# Patient Record
Sex: FEMALE | Race: BLACK OR AFRICAN AMERICAN | NOT HISPANIC OR LATINO | Employment: PART TIME | URBAN - METROPOLITAN AREA
[De-identification: names, ages, dates, MRNs, and addresses within clinical notes are randomized per-mention and may not be internally consistent; named-entity substitution may affect disease eponyms.]

---
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
## 2024-01-03 PROBLEM — O99.011: Status: ACTIVE | Noted: 2024-01-03

## 2024-03-14 PROBLEM — Z37.9 NORMAL LABOR: Status: ACTIVE | Noted: 2024-03-14

## 2024-03-15 PROBLEM — Z37.9 NORMAL LABOR: Status: RESOLVED | Noted: 2024-03-14 | Resolved: 2024-03-15
